# Patient Record
(demographics unavailable — no encounter records)

---

## 2025-03-18 NOTE — ASSESSMENT
[FreeTextEntry1] : #HCM - Patient presenting for annual physical exam - Received flu vaccine this season - Up to date with colonoscopy - Will check routine blood work today and discuss results with patient   #T2DM - Chronic - Poorly controlled - Current diabetic regimen: Lantus 20u nightly, Jardiance 25mg, Glimepiride 2mg two tabs BID, pioglitazone 15mg daily - Previously on Ozempic but had visual changes and stopped. Endocrinologist ordered Wegovy but patient unable to  and did not start  - Discussed Mounjaro with patient, interested in trying will send to pharmacy - Check A1c today, albumin:cr ratio, UA  - Last seen by optho in 1/2024 - will make f/u   #CAD/HLD - History of 4 vessel CABG in 2017 - On ASA 81mg, Lipitor 80mg, Toprol 25mg, Fenofibrate 145mg - Off Zetia

## 2025-03-18 NOTE — HISTORY OF PRESENT ILLNESS
[FreeTextEntry1] : CPE [de-identified] : 61yo male with PMH of CAD s/p CABG in 2017, HLD, T2DM, chronic lower extremity swelling who presents to the office for annual physical examination.   Reports a little worsening constipation lately.  Admits to some wheezing when he is lying flat.   Follows regularly with cardiology Dr. Bonilla. History of an abnormal nuclear stress test in 6/2017, referred for cardiac cath which revealed severe multi vessel disease and underwent 4 vessel CABG.   Follows with endocrinology Dr. Majano for management of T2DM. History of elevated A1c, 9.4% in 6/2024.  Prescribed Lantus 20u nightly, Jardiance 25mg, Glimepiride 2mg two tabs BID, pioglitazone 15mg daily. He was on Ozempic previously but had bouts of blurry vision and was switched to Wegovy but never received the medication.  He is interested in trying a GLP-1 agonist again.   Follows with orthopedist Dr. Elliott for history of chronic right knee pain from underlying right knee osteoarthritis. Has had corticosteroid injections in the past.

## 2025-03-18 NOTE — HEALTH RISK ASSESSMENT
[Patient reported colonoscopy was normal] : Patient reported colonoscopy was normal [Very Good] : ~his/her~  mood as very good [Yes] : Yes [Monthly or less (1 pt)] : Monthly or less (1 point) [1 or 2 (0 pts)] : 1 or 2 (0 points) [Never (0 pts)] : Never (0 points) [No] : In the past 12 months have you used drugs other than those required for medical reasons? No [0] : 2) Feeling down, depressed, or hopeless: Not at all (0) [PHQ-2 Negative - No further assessment needed] : PHQ-2 Negative - No further assessment needed [Never] : Never [With Family] : lives with family [Employed] : employed [] :  [Smoke Detector] : smoke detector [Carbon Monoxide Detector] : carbon monoxide detector [Seat Belt] :  uses seat belt [Sunscreen] : uses sunscreen [Audit-CScore] : 1 [de-identified] : walking  [de-identified] : poor diet recently  [HUJ3Adbry] : 0 [No Retinopathy] : No retinopathy [EyeExamDate] : 01/24 [Reports changes in hearing] : Reports no changes in hearing [Reports changes in vision] : Reports no changes in vision [Reports changes in dental health] : Reports no changes in dental health [ColonoscopyDate] : 01/23

## 2025-03-18 NOTE — HISTORY OF PRESENT ILLNESS
[FreeTextEntry1] : Pt is a 61 y/o M who presents today for f/u.  PMH CAD s/p CABG 2017, mild AS, HLD, RBBB, uncontrolled DM, chronic LE swelling (uses compression stockings).   He was found to have an abnormal nuclear stress test 06/2017 and sent for cardiac cath showing multi-vessel disease - mLM40%, pLAD 90%, D1 90%, pCx 100%, pRCA 80%, mRCA 90%.  He subsequently had bypass surgery with Dr Bess on 8/7/2017- 4V CABG. Normal LV function.  Procedure went well and there were no complications.    Pt reports feeling well and has no active cardiac complaints - denies CP, SOB, palpitations, dizziness, syncope, edema, orthopnea, PND, orthopnea.  No exertional symptoms. Walks 1 mile qd No new hospitalizations, emergency room/urgent care visits, new medical diagnoses, new medications, surgery, or cardiac testing since last OV.   Nuc stress test 06/2023 med sized, mod fixed inf wall defect, EF 41% TTE 04/2023 EF 55-60%, mild AS MG 16 DEACON 1.87 TTE 03/2024 EF 55-60%, mild AS MG 11 DEACON 1.58 CUS 04/2024 mild plaque  fam hx: mother LARON

## 2025-03-18 NOTE — DISCUSSION/SUMMARY
[EKG obtained to assist in diagnosis and management of assessed problem(s)] : EKG obtained to assist in diagnosis and management of assessed problem(s) [FreeTextEntry1] : Pt is a 63 y/o M 4V CABG with Dr Bess at [facility] 8/2017.  Normal LV function   Feeling well, remains active without symptoms  c/w ASA 81mg c/w lipitor 80mg qd  he states that he has not been taking zetia - not sure why - labs done today, results pending - if needed will restart c/w fenofibrate goal LDL <70 c/w metoprolol succ 25mg qd - BP well controlled Advised heart healthy diet, regular exercise labs done this morning - results pending We discussed the importance of aggressive risk factor modification, including continuing medications as directed, following a healthy diet of unprocessed, low saturated fat and carbohydrate diet as close to a plant based or Mediterranean as possible, regular exercise at least 30 minutes of cardiovascular exercise daily.  Also advised to report any symptoms immediately.   AS: asymptomatic will monitor will repeat TTE   Pt will return in 6 mnths or sooner as needed but I encouraged communication via phone or portal if necessary.  The described plan was discussed with the pt.  All questions and concerns were addressed to the best of my knowledge.

## 2025-03-18 NOTE — REVIEW OF SYSTEMS
[Constipation] : constipation [Fever] : no fever [Chills] : no chills [Vision Problems] : no vision problems [Earache] : no earache [Nasal Discharge] : no nasal discharge [Sore Throat] : no sore throat [Chest Pain] : no chest pain [Palpitations] : no palpitations [Shortness Of Breath] : no shortness of breath [Cough] : no cough [Abdominal Pain] : no abdominal pain [Nausea] : no nausea [Diarrhea] : no diarrhea [Vomiting] : no vomiting [Dysuria] : no dysuria [Nocturia] : no nocturia [Frequency] : no frequency [Joint Pain] : no joint pain [Back Pain] : no back pain [Itching] : no itching [Mole Changes] : no mole changes [Skin Rash] : no skin rash [Headache] : no headache [Dizziness] : no dizziness [FreeTextEntry8] : nocturia once per night

## 2025-06-10 NOTE — PHYSICAL EXAM
[Alert] : alert [Well Nourished] : well nourished [No Acute Distress] : no acute distress [Well Developed] : well developed [Normal Sclera/Conjunctiva] : normal sclera/conjunctiva [EOMI] : extra ocular movement intact [No Proptosis] : no proptosis [Normal Oropharynx] : the oropharynx was normal [No Neck Mass] : no neck mass was observed [Thyroid Not Enlarged] : the thyroid was not enlarged [No Thyroid Nodules] : no palpable thyroid nodules [No Respiratory Distress] : no respiratory distress [No Accessory Muscle Use] : no accessory muscle use [Clear to Auscultation] : lungs were clear to auscultation bilaterally [Normal S1, S2] : normal S1 and S2 [Normal Rate] : heart rate was normal [Regular Rhythm] : with a regular rhythm [Carotids Normal] : carotid pulses were normal with no bruits [No Edema] : no peripheral edema [Pedal Pulses Normal] : the pedal pulses are present [Normal Bowel Sounds] : normal bowel sounds [Not Tender] : non-tender [Not Distended] : not distended [Soft] : abdomen soft [Normal Anterior Cervical Nodes] : no anterior cervical lymphadenopathy [Normal Posterior Cervical Nodes] : no posterior cervical lymphadenopathy [No Spinal Tenderness] : no spinal tenderness [Spine Straight] : spine straight [No Stigmata of Cushings Syndrome] : no stigmata of Cushings Syndrome [Normal Gait] : normal gait [No Rash] : no rash [Normal Strength/Tone] : muscle strength and tone were normal [Acanthosis Nigricans] : no acanthosis nigricans [No Motor Deficits] : the motor exam was normal [No Sensory Deficits] : the sensory exam was normal to light touch and pinprick [Normal Reflexes] : deep tendon reflexes were 2+ and symmetric [Oriented x3] : oriented to person, place, and time [No Tremors] : no tremors [de-identified] : Patient BMI is of 41.4 his weight is 295 pounds [de-identified] :  soft systolic murmur over the left sternal border [de-identified] : Central obesity

## 2025-06-10 NOTE — ASSESSMENT
[Diabetes Foot Care] : diabetes foot care [Long Term Vascular Complications] : long term vascular complications of diabetes [Carbohydrate Consistent Diet] : carbohydrate consistent diet [Importance of Diet and Exercise] : importance of diet and exercise to improve glycemic control, achieve weight loss and improve cardiovascular health [Exercise/Effect on Glucose] : exercise/effect on glucose [Hypoglycemia Management] : hypoglycemia management [Self Monitoring of Blood Glucose] : self monitoring of blood glucose [Retinopathy Screening] : Patient was referred to ophthalmology for retinopathy screening [FreeTextEntry1] : Middle-aged white male with a past medical history of type 2 diabetes which is currently uncontrolled.  His most recent hemoglobin A1c level was 11.6%, TSH level is 2.61 complete metabolic panel is normal, LDL is 113 mg per DL.  Patient is with poor compliance with his diet.  Recommendation 1.  I have advised the patient to start treatment with Ozempic 0.25 mg once a week and then to change to 0.5 mg weekly if tolerated. 2.  Patient with also start with insulin lispro 5 units in the morning before breakfast and 10 units in the evening before dinner 3.  Patient will continue with the Jardiance 25 mg daily, glimepiride 2 mg daily, pioglitazone 15 mg daily and insulin Lantus 30 units every day at night. 4.  The importance of strict low carbohydrate and low-fat diet was discussed with the patient and the role of regular physical exercise was explained to the patient. 5.  If the condition of the patient remains stable he will follow-up in 3 months time with a repeat blood test.  Plan was discussed with the patient thank you

## 2025-06-10 NOTE — HISTORY OF PRESENT ILLNESS
[FreeTextEntry1] :  62-year-old white male with a past medical history of type 2 diabetes, artery disease, hyperlipidemia and obesity presents for routine follow-up.Patient is currently on insulin Lantus 30 units at night, pioglitazone 50 mg daily, glimepiride 2 mg daily, and Jardiance 25 mg daily.  According to the patient he never got the supply of the insulin lispro.  Patient checks his sugar levels mostly in the morning and they range between  120 to 150 mg per DL.  He does complain of significant polyuria and polydipsia and occasional nocturia.  His weight has increased slightly and his vision has remained stable.  He does walk at least 1 to 2 miles on a daily basis at his work patient denies any symptoms of hypoglycemia, chest pain, shortness of breath or numbness or of his extremities.Hs other mdications iInclude regular aspirin, Atorvastatin, metoprolol, And fenofibrate.  Review of systems is otherwise negative.

## 2025-07-26 NOTE — IMAGING
[de-identified] : LEFT KNEE Inspection: mild effusion Palpation: medial joint line tenderness, anterior tenderness Knee Range of Motion: 3-125  Strength: 5/5 Quadriceps strength, 5/5 Hamstring strength Neurological: light touch is intact throughout Ligament Stability and Special Tests:  McMurrays: neg Lachman: neg Pivot Shift: neg Posterior Drawer: neg Valgus: neg Varus: neg Patella Apprehension: neg Patella Maltracking: neg  Abdomen soft, non-tender, no guarding.

## 2025-07-26 NOTE — DISCUSSION/SUMMARY
[de-identified] : Left X-Ray Examination of the KNEE (4 views): mild medial and patellofemoral degenerate changes.  Assessment:   The patient is a 62 year old male with physical exam findings consistent with LEFT KNEE OSTEOARTHRITIS   - Patient has venous insufficiency on the left leg and wears a sleeve at all times since he was a kid. - We discussed their diagnosis and treatment options at length including the risks and benefits of both surgical and nonsurgical options. - We will continue conservative treatment with activity modification, icing, weight loss, and anti-inflammatory medications. - The patient was advised to let pain guide the gradual advancement of activities. - The risks, benefits, and alternatives to corticosteroid injections were reviewed with the patient. Risks outlined include but are not limited to infection, sepsis, bleeding, scarring, skin discoloration, temporary increase in pain, syncopal episode, failure to resolve symptoms, symptoms recurrence, allergic reaction, flare reaction, and elevation of blood sugar in diabetics. Patient understood the risks and asked to proceed with this treatment course. Grace well.  - We discussed possibility of gel inj in the future.   Follow up: 3 months or sooner as needed If pain continues discuss MRI vs gel series

## 2025-07-26 NOTE — PROCEDURE
[FreeTextEntry3] : Patient Identification Name/: Verbal with patient and/or family   Procedure Verification: Procedure confirmed with patient or family/designee Consent for procedure: Verbal Consent Given Relevant documentation completed, reviewed, and signed Clinical indications for procedure confirmed   Time-out with all members of procedure team immediately prior to procedure: Correct patient identified. Agreement on procedure. Correct side and site.  KNEE INJECTION (STEROID) - LEFT  After verbal consent and identification of the correct patient and correct site, the anterolateral LEFT knee was prepped using alcohol. This was allowed time to air dry. A mixture of 1cc Celestone 6mg/ml, 2cc Lidocaine 1%, and 2cc Bupivacaine 0.5% was injected into the knee using a sterile 22G needle after ethyl chloride spray for skin anesthesia. The patient tolerated the procedure well. After-care instructions were provided and included instructions to ice the area and to call if redness, pain, or fever develop. Visualization of the needle and placement of the injection was performed without any complications.

## 2025-07-26 NOTE — HISTORY OF PRESENT ILLNESS
[de-identified] : 07/16/2025: The patient is a 62 year old M, right hand dominant who presents today complaining of left knee pain. Date of Injury/Onset: 3 weeks  Pain:    At Rest: 3/10 With Activity:  6/10 Mechanism of injury: NKI, pain began after gardening  This is [not] a Work Related Injury being treated under Worker's Compensation. This is [not] an athletic injury occurring associated with an interscholastic or organized sports team. 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

## 2025-07-26 NOTE — DISCUSSION/SUMMARY
[de-identified] : Left X-Ray Examination of the KNEE (4 views): mild medial and patellofemoral degenerate changes.  Assessment:   The patient is a 62 year old male with physical exam findings consistent with LEFT KNEE OSTEOARTHRITIS   - Patient has venous insufficiency on the left leg and wears a sleeve at all times since he was a kid. - We discussed their diagnosis and treatment options at length including the risks and benefits of both surgical and nonsurgical options. - We will continue conservative treatment with activity modification, icing, weight loss, and anti-inflammatory medications. - The patient was advised to let pain guide the gradual advancement of activities. - The risks, benefits, and alternatives to corticosteroid injections were reviewed with the patient. Risks outlined include but are not limited to infection, sepsis, bleeding, scarring, skin discoloration, temporary increase in pain, syncopal episode, failure to resolve symptoms, symptoms recurrence, allergic reaction, flare reaction, and elevation of blood sugar in diabetics. Patient understood the risks and asked to proceed with this treatment course. Grace well.  - We discussed possibility of gel inj in the future.   Follow up: 3 months or sooner as needed If pain continues discuss MRI vs gel series

## 2025-07-26 NOTE — IMAGING
[de-identified] : LEFT KNEE Inspection: mild effusion Palpation: medial joint line tenderness, anterior tenderness Knee Range of Motion: 3-125  Strength: 5/5 Quadriceps strength, 5/5 Hamstring strength Neurological: light touch is intact throughout Ligament Stability and Special Tests:  McMurrays: neg Lachman: neg Pivot Shift: neg Posterior Drawer: neg Valgus: neg Varus: neg Patella Apprehension: neg Patella Maltracking: neg

## 2025-07-26 NOTE — HISTORY OF PRESENT ILLNESS
[de-identified] : 07/16/2025: The patient is a 62 year old M, right hand dominant who presents today complaining of left knee pain. Date of Injury/Onset: 3 weeks  Pain:    At Rest: 3/10 With Activity:  6/10 Mechanism of injury: NKI, pain began after gardening  This is [not] a Work Related Injury being treated under Worker's Compensation. This is [not] an athletic injury occurring associated with an interscholastic or organized sports team. 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